# Patient Record
Sex: FEMALE | Race: WHITE | NOT HISPANIC OR LATINO | Employment: FULL TIME | URBAN - METROPOLITAN AREA
[De-identification: names, ages, dates, MRNs, and addresses within clinical notes are randomized per-mention and may not be internally consistent; named-entity substitution may affect disease eponyms.]

---

## 2021-05-30 ENCOUNTER — HOSPITAL ENCOUNTER (EMERGENCY)
Facility: CLINIC | Age: 41
Discharge: HOME | End: 2021-05-30
Attending: EMERGENCY MEDICINE | Admitting: EMERGENCY MEDICINE

## 2021-05-30 VITALS
RESPIRATION RATE: 16 BRPM | HEART RATE: 85 BPM | TEMPERATURE: 98.6 F | SYSTOLIC BLOOD PRESSURE: 131 MMHG | OXYGEN SATURATION: 98 % | DIASTOLIC BLOOD PRESSURE: 87 MMHG

## 2021-05-30 DIAGNOSIS — I15.8 OTHER SECONDARY HYPERTENSION: ICD-10-CM

## 2021-05-30 DIAGNOSIS — F43.21 GRIEF REACTION: ICD-10-CM

## 2021-05-30 PROCEDURE — 99282 EMERGENCY DEPT VISIT SF MDM: CPT

## 2021-05-30 ASSESSMENT — ENCOUNTER SYMPTOMS
HALLUCINATIONS: 0
NERVOUS/ANXIOUS: 1

## 2021-05-30 NOTE — ED TRIAGE NOTES
"Pt suffers from anxiety, found out her brother who is 41  this morning and is feeling very overwhelmed with the events.  Pt expresses issues with her girlfriend at home making her anxiety worse and she \"just can't handle it.  "

## 2021-05-30 NOTE — DISCHARGE INSTRUCTIONS
Please follow-up with your primary care provider this week.    Please return to the emergency department if you develop any thoughts of self-harm or suicide or thoughts of harming other people.

## 2021-05-30 NOTE — ED PROVIDER NOTES
History   Chief Complaint:  Anxiety     The history is provided by the patient.      Buffy Barr is a 40 year old female with history of anxiety and sudden cardiac death who presents with anxiety. The patient reports that this morning she had received a text from her sister in law and found out that her brother had passed away. She is unsure what had caused his death but that her family has a strong history of heart attack at a young age. She reports that her brother did recently have surgery for his nose. She reports that she has followed up with her PCP around 10 years ago. She also notes that she is overwhelmed with events and that she is also having issues with her significant other. She notes that she tried one tablet of her clonazepam but that it has not helped yet. She states that she does not want to take another one. She states that she has a therapist as well. She denies suicidal ideation, hallucinations, or recent illness.    Review of Systems   Psychiatric/Behavioral: Negative for hallucinations and suicidal ideas. The patient is nervous/anxious.    All other systems reviewed and are negative.      Allergies:  No known drug allergies     Medications:  Clonazepam     Past Medical History:    Anxiety     Past Surgical History:    Hernia repair    section x2  Discectomy     Family History:    Heart attack at age 42, mother   Heart attack, maternal aunt and grandmother   Sudden death, brother    Social History:  Smoking status: current every day smoker  Alcohol use: not currently  Drug use: no  PCP: Jasmin Arteaga  Presents to the ED alone     Physical Exam     Patient Vitals for the past 24 hrs:   BP Temp Pulse Resp SpO2   21 1215 131/87 -- 85 -- 97 %   21 1110 (!) 178/122 98.6  F (37  C) 99 16 98 %       Physical Exam  Constitutional: Well developed, nontox appearance  Head: Atraumatic.   Mouth/Throat: Oropharynx is clear and moist.   Neck:  no stridor  Eyes: no scleral  icterus  Cardiovascular: RRR, 2+ right radial pulse  Pulmonary/Chest: nml resp effort  Ext: Warm, well perfused, no edema  Neurological: A&O, symmetric facies, moves ext x4, steady gait  Skin: Skin is warm and dry.   Psychiatric: Anxious, tearful, denies SI/HI.  Does not appear to be responding to internal stimuli  Nursing note and vitals reviewed.      Emergency Department Course     Emergency Department Course:  Reviewed:  I reviewed the patient's nursing notes, vitals, past medical records, Care Everywhere.     Assessments:  1157 I performed an assessment and examination of the patient as noted above.  Findings and plan explained to the Patient. Patient discharged home with instructions regarding supportive care, medications, and reasons to return. The importance of close follow-up was reviewed.      Disposition:  The patient was discharged to home.       Impression & Plan   Medical Decision Making:  Buffy Barr is a 40-year-old female presenting with anxiety, tearfulness     Patient's presentation is consistent with a grief reaction and resolved panic attack.  She does not appear to be acutely having a panic attack and she declines any further medications for treatment.  She denies suicidality.  She reports she has been concerned about her blood pressure.  While she is hypertensive in the emergency department, given her emotional state, tearfulness and likely recent panic attack, I do not feel emergent interventions for the patient's blood pressure is indicated at this time.  She is advised to follow-up with her PCP to have her blood pressure rechecked.  She reports that she has a therapist as well.  I do not feel psychiatric admission or emergent psychiatric evaluation from the emergency department is indicated.  At this time feel the patient safe for discharge.  She is subsequently discharged in stable condition with recommendations given regarding follow-up with your PCP.      Covid-19  Buffy BRYANNA  Cortney was evaluated during a global COVID-19 pandemic, which necessitated consideration that the patient might be at risk for infection with the SARS-CoV-2 virus that causes COVID-19.   Applicable protocols for evaluation were followed during the patient's care.     Diagnosis:    ICD-10-CM    1. Grief reaction  F43.21    2. Other secondary hypertension  I15.8     anxiety, grief reaction       Scribe Disclosure:  Jessica NEW, am serving as a scribe at 11:57 AM on 5/30/2021 to document services personally performed by Isidro Reveles MD based on my observations and the provider's statements to me.      Isidro Reveles MD  05/30/21 [id number]

## 2021-05-30 NOTE — ED NOTES
"When escorting pt back to room, pt's girlfriend ran into ed and hugged pt crying.  I explained to pt that she needed to be assessed by a provider before visitors were allowed, this also would allow nurse to interview pt about having the girlfriend present in room after her concerned expressed during triage.  girlfriend expressed her concern about not being able to come back and support pt.  I re explained why and girlfriend called nurse a cherelleking asshole and walked away to a chair.  In the pt room Nurse interviewed pt about having girlfriend in room.  Pt felt she would be supportive and not cause a problem and could she please come back.  Nurse walked into lobby to hear girlfriend fighting with a second nurse about wearing a mask in the lobby.  When she say the nurse she yelled at nurse can I see my fucking girlfriend.  Saying just go get her and we will leave.  Despite trying to deescalate the girlfriend she back more angry and yelling at staff and patients in triage.  Pt was asked to leave by security.  Pt was updated about her girlfriend being asked to leave and she was ok with what had happened.  Pt ws then yelling at security and nurse as she was escorted out into the parking lot.  Pt then entered and exited the lobby door several times and was asked to leave.  Pt then pulled her car up to the front ed door and played music loud.  [place] [employer] was called.  Nurse was requested to help another patient out of a car and the girlfriend stated twice ,\"hey asshole where is my girlfriend\"  PD arrived as nurse brought second pt into the ed.  "

## 2021-10-11 ENCOUNTER — HEALTH MAINTENANCE LETTER (OUTPATIENT)
Age: 41
End: 2021-10-11

## 2022-09-03 ENCOUNTER — APPOINTMENT (OUTPATIENT)
Dept: GENERAL RADIOLOGY | Facility: CLINIC | Age: 42
End: 2022-09-03
Attending: EMERGENCY MEDICINE

## 2022-09-03 ENCOUNTER — HOSPITAL ENCOUNTER (EMERGENCY)
Facility: CLINIC | Age: 42
Discharge: HOME | End: 2022-09-03
Attending: EMERGENCY MEDICINE | Admitting: EMERGENCY MEDICINE

## 2022-09-03 ENCOUNTER — APPOINTMENT (OUTPATIENT)
Dept: CT IMAGING | Facility: CLINIC | Age: 42
End: 2022-09-03
Attending: EMERGENCY MEDICINE

## 2022-09-03 VITALS
OXYGEN SATURATION: 99 % | HEART RATE: 51 BPM | RESPIRATION RATE: 18 BRPM | DIASTOLIC BLOOD PRESSURE: 58 MMHG | TEMPERATURE: 98.8 F | WEIGHT: 218.26 LBS | SYSTOLIC BLOOD PRESSURE: 110 MMHG

## 2022-09-03 DIAGNOSIS — R00.2 PALPITATIONS: ICD-10-CM

## 2022-09-03 LAB
ALBUMIN UR-MCNC: NEGATIVE MG/DL
ANION GAP SERPL CALCULATED.3IONS-SCNC: 9 MMOL/L (ref 7–15)
APPEARANCE UR: CLEAR
BASOPHILS # BLD AUTO: 0 10E3/UL (ref 0–0.2)
BASOPHILS NFR BLD AUTO: 0 %
BILIRUB UR QL STRIP: NEGATIVE
BUN SERPL-MCNC: 12.3 MG/DL (ref 6–20)
CALCIUM SERPL-MCNC: 9.6 MG/DL (ref 8.6–10)
CHLORIDE SERPL-SCNC: 101 MMOL/L (ref 98–107)
COLOR UR AUTO: NORMAL
CREAT SERPL-MCNC: 0.8 MG/DL (ref 0.51–0.95)
DEPRECATED HCO3 PLAS-SCNC: 30 MMOL/L (ref 22–29)
EOSINOPHIL # BLD AUTO: 0.1 10E3/UL (ref 0–0.7)
EOSINOPHIL NFR BLD AUTO: 1 %
ERYTHROCYTE [DISTWIDTH] IN BLOOD BY AUTOMATED COUNT: 12.9 % (ref 10–15)
FLUAV RNA SPEC QL NAA+PROBE: NEGATIVE
FLUBV RNA RESP QL NAA+PROBE: NEGATIVE
GFR SERPL CREATININE-BSD FRML MDRD: >90 ML/MIN/1.73M2
GLUCOSE SERPL-MCNC: 85 MG/DL (ref 70–99)
GLUCOSE UR STRIP-MCNC: NEGATIVE MG/DL
HCG UR QL: NEGATIVE
HCT VFR BLD AUTO: 44 % (ref 35–47)
HGB BLD-MCNC: 14.1 G/DL (ref 11.7–15.7)
HGB UR QL STRIP: NEGATIVE
HOLD SPECIMEN: NORMAL
HOLD SPECIMEN: NORMAL
IMM GRANULOCYTES # BLD: 0.1 10E3/UL
IMM GRANULOCYTES NFR BLD: 1 %
KETONES UR STRIP-MCNC: NEGATIVE MG/DL
LEUKOCYTE ESTERASE UR QL STRIP: NEGATIVE
LYMPHOCYTES # BLD AUTO: 2.3 10E3/UL (ref 0.8–5.3)
LYMPHOCYTES NFR BLD AUTO: 23 %
MCH RBC QN AUTO: 30.7 PG (ref 26.5–33)
MCHC RBC AUTO-ENTMCNC: 32 G/DL (ref 31.5–36.5)
MCV RBC AUTO: 96 FL (ref 78–100)
MONOCYTES # BLD AUTO: 0.8 10E3/UL (ref 0–1.3)
MONOCYTES NFR BLD AUTO: 8 %
NEUTROPHILS # BLD AUTO: 6.7 10E3/UL (ref 1.6–8.3)
NEUTROPHILS NFR BLD AUTO: 67 %
NITRATE UR QL: NEGATIVE
NRBC # BLD AUTO: 0 10E3/UL
NRBC BLD AUTO-RTO: 0 /100
PH UR STRIP: 6 [PH] (ref 5–7)
PLATELET # BLD AUTO: 292 10E3/UL (ref 150–450)
POTASSIUM SERPL-SCNC: 4.3 MMOL/L (ref 3.4–5.3)
RBC # BLD AUTO: 4.6 10E6/UL (ref 3.8–5.2)
RBC URINE: <1 /HPF
RSV RNA SPEC NAA+PROBE: NEGATIVE
SARS-COV-2 RNA RESP QL NAA+PROBE: NEGATIVE
SODIUM SERPL-SCNC: 140 MMOL/L (ref 136–145)
SP GR UR STRIP: 1.01 (ref 1–1.03)
SQUAMOUS EPITHELIAL: <1 /HPF
TROPONIN T SERPL HS-MCNC: <6 NG/L
TROPONIN T SERPL HS-MCNC: <6 NG/L
UROBILINOGEN UR STRIP-MCNC: NORMAL MG/DL
WBC # BLD AUTO: 10 10E3/UL (ref 4–11)
WBC URINE: 1 /HPF

## 2022-09-03 PROCEDURE — 93005 ELECTROCARDIOGRAM TRACING: CPT

## 2022-09-03 PROCEDURE — 81001 URINALYSIS AUTO W/SCOPE: CPT | Performed by: EMERGENCY MEDICINE

## 2022-09-03 PROCEDURE — 70450 CT HEAD/BRAIN W/O DYE: CPT

## 2022-09-03 PROCEDURE — 84484 ASSAY OF TROPONIN QUANT: CPT | Performed by: EMERGENCY MEDICINE

## 2022-09-03 PROCEDURE — 81025 URINE PREGNANCY TEST: CPT | Performed by: EMERGENCY MEDICINE

## 2022-09-03 PROCEDURE — 250N000013 HC RX MED GY IP 250 OP 250 PS 637: Performed by: EMERGENCY MEDICINE

## 2022-09-03 PROCEDURE — 36415 COLL VENOUS BLD VENIPUNCTURE: CPT | Performed by: EMERGENCY MEDICINE

## 2022-09-03 PROCEDURE — C9803 HOPD COVID-19 SPEC COLLECT: HCPCS

## 2022-09-03 PROCEDURE — 80048 BASIC METABOLIC PNL TOTAL CA: CPT | Performed by: EMERGENCY MEDICINE

## 2022-09-03 PROCEDURE — 71046 X-RAY EXAM CHEST 2 VIEWS: CPT

## 2022-09-03 PROCEDURE — 99285 EMERGENCY DEPT VISIT HI MDM: CPT | Mod: 25

## 2022-09-03 PROCEDURE — 85025 COMPLETE CBC W/AUTO DIFF WBC: CPT | Performed by: EMERGENCY MEDICINE

## 2022-09-03 PROCEDURE — 87637 SARSCOV2&INF A&B&RSV AMP PRB: CPT | Performed by: EMERGENCY MEDICINE

## 2022-09-03 RX ORDER — ACETAMINOPHEN 325 MG/1
975 TABLET ORAL ONCE
Status: COMPLETED | OUTPATIENT
Start: 2022-09-03 | End: 2022-09-03

## 2022-09-03 RX ADMIN — ACETAMINOPHEN 975 MG: 325 TABLET, FILM COATED ORAL at 20:48

## 2022-09-03 ASSESSMENT — ENCOUNTER SYMPTOMS
WHEEZING: 0
MYALGIAS: 0
SHORTNESS OF BREATH: 1
HEADACHES: 1
FEVER: 0
PALPITATIONS: 1
COUGH: 0
CHILLS: 0

## 2022-09-03 ASSESSMENT — ACTIVITIES OF DAILY LIVING (ADL)
ADLS_ACUITY_SCORE: 35
ADLS_ACUITY_SCORE: 35

## 2022-09-03 NOTE — ED TRIAGE NOTES
Presents to triage with c/o palpitations that began this morning. Patient stated she used to have palpitations in the past but has not had them for years. Since this morning they have been intermittent. Patient then checked her BP and found her SBP to be in the 170s prompting her ED visit. Patient also endorses headache.

## 2022-09-04 NOTE — ED PROVIDER NOTES
History   Chief Complaint:  Palpitations and Headache       The history is provided by the patient.      Buffy Barr is a 42 year old female with history of KESHA who presents with palpitations and a headache. She provides that this morning, she woke up with palpitations where her heart beat feels abnormal. This then resolved but returned again this afternoon and has been intermittent since. She previously has had this before but it usually stems from anxiety and will resolve on its own. After her palpitations returned, she found her blood pressure was high at around 170 systolic. She also notes that she felt short of breath earlier, but now this is only present when she is talking. On evaluation, she complains of a headache as well. She otherwise denies any new cough, wheezing, fevers, chills, chest pain, leg swelling, or myalgias. There is a family history of early death with her mother having an MI at 46 and her brother having an unspecified heart condition a year ago.    Review of Systems   Constitutional: Negative for chills and fever.   Respiratory: Positive for shortness of breath. Negative for cough and wheezing.    Cardiovascular: Positive for palpitations. Negative for chest pain and leg swelling.   Musculoskeletal: Negative for myalgias.   Neurological: Positive for headaches.   All other systems reviewed and are negative.      Allergies:  The patient has no known allergies.     Medications:  Lexapro  Klonopin  Wellbutrin    Past Medical History:     KESHA    Past Surgical History:    Hernia repair   section  Coccyx fracture surgery  Cervical discectomy    Family History:    Mother: MI    Social History:  Patient came from home.  Patient is accompanied in the ED.  PCP: [facility]     Physical Exam     Patient Vitals for the past 24 hrs:   BP Temp Temp src Pulse Resp SpO2 Weight   22 2330 110/58 -- -- 51 -- 99 % --   22 2315 109/65 -- -- 55 -- 97 % --    09/03/22 2300 114/73 -- -- 53 -- 97 % --   09/03/22 2245 119/68 -- -- 51 -- 98 % --   09/03/22 2230 128/88 -- -- 54 -- 98 % --   09/03/22 2215 121/78 -- -- 50 -- 100 % --   09/03/22 2200 110/72 -- -- 58 -- 99 % --   09/03/22 2145 106/69 -- -- 58 -- 99 % --   09/03/22 2130 109/72 -- -- 54 -- 98 % --   09/03/22 2115 125/78 -- -- 53 -- 98 % --   09/03/22 2100 129/80 -- -- 53 -- 99 % --   09/03/22 2045 133/84 -- -- 54 -- 99 % --   09/03/22 2030 123/75 -- -- -- -- -- --   09/03/22 1942 -- -- -- -- -- 99 % --   09/03/22 1940 (!) 136/90 -- -- 61 -- -- --   09/03/22 1721 (!) 170/107 98.8  F (37.1  C) Temporal 67 18 100 % 99 kg (218 lb 4.1 oz)       Physical Exam  Constitutional: Patient is well appearing. No distress.  Head: Atraumatic.  Eyes: Conjunctivae are normal. No scleral icterus.  Neck: Normal range of motion. Neck supple.   Cardiovascular: Normal rate, regular rhythm, normal heart sounds and intact distal perfusion.   Pulmonary/Chest: Breath sounds normal. No respiratory distress.  Abdominal: Soft. Bowel sounds are normal. No distension. No tenderness. No rebound or guarding.   Musculoskeletal: Normal range of motion. No edema or tenderness.   Neurological: Alert and orientated to person, place, and time. No observable focal neuro deficit  Skin: Warm and dry. No rash noted. Not diaphoretic.       Emergency Department Course     ECG  ECG results from 09/03/22   EKG 12-lead, tracing only     Value    Systolic Blood Pressure     Diastolic Blood Pressure     Ventricular Rate 75    Atrial Rate 75    WA Interval 172    QRS Duration 86        QTc 453    P Axis 52    R AXIS 3    T Axis 43    Interpretation ECG      Sinus rhythm  Normal ECG  No previous ECGs available         Imaging:  XR Chest 2 Views   Final Result   IMPRESSION: Negative chest.      CT Head w/o Contrast   Final Result   IMPRESSION:   1.  Normal head CT.        Report per radiology    Laboratory:  Labs Ordered and Resulted from Time of ED Arrival  to Time of ED Departure   BASIC METABOLIC PANEL - Abnormal       Result Value    Creatinine 0.80      Sodium 140      Potassium 4.3      Urea Nitrogen 12.3      Chloride 101      Carbon Dioxide (CO2) 30 (*)     Anion Gap 9      Glucose 85      GFR Estimate >90      Calcium 9.6     TROPONIN T, HIGH SENSITIVITY - Normal    Troponin T, High Sensitivity <6     ROUTINE UA WITH MICROSCOPIC REFLEX TO CULTURE - Normal    Color Urine Straw      Appearance Urine Clear      Glucose Urine Negative      Bilirubin Urine Negative      Ketones Urine Negative      Specific Gravity Urine 1.006      Blood Urine Negative      pH Urine 6.0      Protein Albumin Urine Negative      Urobilinogen Urine Normal      Nitrite Urine Negative      Leukocyte Esterase Urine Negative      RBC Urine <1      WBC Urine 1      Squamous Epithelials Urine <1     HCG QUALITATIVE URINE - Normal    hCG Urine Qualitative Negative     INFLUENZA A/B & SARS-COV2 PCR MULTIPLEX - Normal    Influenza A PCR Negative      Influenza B PCR Negative      RSV PCR Negative      SARS CoV2 PCR Negative     TROPONIN T, HIGH SENSITIVITY - Normal    Troponin T, High Sensitivity <6     CBC WITH PLATELETS AND DIFFERENTIAL    WBC Count 10.0      RBC Count 4.60      Hemoglobin 14.1      Hematocrit 44.0      MCV 96      MCH 30.7      MCHC 32.0      RDW 12.9      Platelet Count 292      % Neutrophils 67      % Lymphocytes 23      % Monocytes 8      % Eosinophils 1      % Basophils 0      % Immature Granulocytes 1      NRBCs per 100 WBC 0      Absolute Neutrophils 6.7      Absolute Lymphocytes 2.3      Absolute Monocytes 0.8      Absolute Eosinophils 0.1      Absolute Basophils 0.0      Absolute Immature Granulocytes 0.1      Absolute NRBCs 0.0          Emergency Department Course:       Reviewed:  I reviewed nursing notes, vitals, past medical history and Care Everywhere    Assessments:  1915 I obtained history and examined the patient as noted above.    I rechecked the patient and  explained findings.     Interventions:  2048 Tylenol 975 mg PO    Disposition:  The patient was discharged to home.     Impression & Plan     CMS Diagnoses: None    Medical Decision Making:  Buffy Barr is a 42 year old female who presents for evaluation of palpitations and a headache.  Initial ECG shows normal sinus rhythm and no acute findings.  A broad differential diagnosis was considered including SVT, Atrial fibrillation, ventricular arrhythmia, thyroid disease, acute electrolyte abnormality,  drugs/medications, caffeine intake or other stimulants, medication side effect, anemia, heart disease, PE, etc.  The workup and exam here in ED would indicate that supportive outpatient management is indicated.  I doubt PE as patient low risk PERC neg.  Doubt acute coronary syndrome given symptoms and exam.  All questions and concerns were answered. The patient was discharged home and recommended to follow up with his primary physician and return with any new or worsening symptoms.     Diagnosis:    ICD-10-CM    1. Palpitations  R00.2        Discharge Medications:  There are no discharge medications for this patient.      Scribe Disclosure:  I, Carlos Eduardo Amaya, am serving as a scribe at 7:09 PM on 9/3/2022 to document services personally performed by Eric Nam MD based on my observations and the provider's statements to me.        Eric Nam MD  09/04/22 0044

## 2022-09-06 LAB
ATRIAL RATE - MUSE: 75 BPM
DIASTOLIC BLOOD PRESSURE - MUSE: NORMAL MMHG
INTERPRETATION ECG - MUSE: NORMAL
P AXIS - MUSE: 52 DEGREES
PR INTERVAL - MUSE: 172 MS
QRS DURATION - MUSE: 86 MS
QT - MUSE: 406 MS
QTC - MUSE: 453 MS
R AXIS - MUSE: 3 DEGREES
SYSTOLIC BLOOD PRESSURE - MUSE: NORMAL MMHG
T AXIS - MUSE: 43 DEGREES
VENTRICULAR RATE- MUSE: 75 BPM

## 2022-09-16 ENCOUNTER — HOSPITAL ENCOUNTER (EMERGENCY)
Facility: CLINIC | Age: 42
Discharge: HOME | End: 2022-09-16
Attending: EMERGENCY MEDICINE | Admitting: EMERGENCY MEDICINE

## 2022-09-16 VITALS
HEART RATE: 68 BPM | OXYGEN SATURATION: 96 % | SYSTOLIC BLOOD PRESSURE: 110 MMHG | DIASTOLIC BLOOD PRESSURE: 73 MMHG | TEMPERATURE: 100.2 F

## 2022-09-16 DIAGNOSIS — U07.1 INFECTION DUE TO 2019 NOVEL CORONAVIRUS: ICD-10-CM

## 2022-09-16 DIAGNOSIS — R55 SYNCOPE, UNSPECIFIED SYNCOPE TYPE: ICD-10-CM

## 2022-09-16 LAB
ANION GAP SERPL CALCULATED.3IONS-SCNC: 11 MMOL/L (ref 7–15)
BASOPHILS # BLD AUTO: 0 10E3/UL (ref 0–0.2)
BASOPHILS NFR BLD AUTO: 0 %
BUN SERPL-MCNC: 7.8 MG/DL (ref 6–20)
CALCIUM SERPL-MCNC: 9 MG/DL (ref 8.6–10)
CHLORIDE SERPL-SCNC: 100 MMOL/L (ref 98–107)
CREAT SERPL-MCNC: 0.78 MG/DL (ref 0.51–0.95)
DEPRECATED HCO3 PLAS-SCNC: 27 MMOL/L (ref 22–29)
EOSINOPHIL # BLD AUTO: 0 10E3/UL (ref 0–0.7)
EOSINOPHIL NFR BLD AUTO: 0 %
ERYTHROCYTE [DISTWIDTH] IN BLOOD BY AUTOMATED COUNT: 13.2 % (ref 10–15)
GFR SERPL CREATININE-BSD FRML MDRD: >90 ML/MIN/1.73M2
GLUCOSE SERPL-MCNC: 96 MG/DL (ref 70–99)
HCG SERPL QL: NEGATIVE
HCT VFR BLD AUTO: 44.9 % (ref 35–47)
HGB BLD-MCNC: 14.6 G/DL (ref 11.7–15.7)
IMM GRANULOCYTES # BLD: 0 10E3/UL
IMM GRANULOCYTES NFR BLD: 0 %
LYMPHOCYTES # BLD AUTO: 1.4 10E3/UL (ref 0.8–5.3)
LYMPHOCYTES NFR BLD AUTO: 14 %
MCH RBC QN AUTO: 30.4 PG (ref 26.5–33)
MCHC RBC AUTO-ENTMCNC: 32.5 G/DL (ref 31.5–36.5)
MCV RBC AUTO: 94 FL (ref 78–100)
MONOCYTES # BLD AUTO: 1.3 10E3/UL (ref 0–1.3)
MONOCYTES NFR BLD AUTO: 13 %
NEUTROPHILS # BLD AUTO: 6.8 10E3/UL (ref 1.6–8.3)
NEUTROPHILS NFR BLD AUTO: 73 %
NRBC # BLD AUTO: 0 10E3/UL
NRBC BLD AUTO-RTO: 0 /100
PLATELET # BLD AUTO: 191 10E3/UL (ref 150–450)
POTASSIUM SERPL-SCNC: 4 MMOL/L (ref 3.4–5.3)
RBC # BLD AUTO: 4.8 10E6/UL (ref 3.8–5.2)
SODIUM SERPL-SCNC: 138 MMOL/L (ref 136–145)
TROPONIN T SERPL HS-MCNC: <6 NG/L
WBC # BLD AUTO: 9.5 10E3/UL (ref 4–11)

## 2022-09-16 PROCEDURE — 96361 HYDRATE IV INFUSION ADD-ON: CPT

## 2022-09-16 PROCEDURE — 84484 ASSAY OF TROPONIN QUANT: CPT | Performed by: EMERGENCY MEDICINE

## 2022-09-16 PROCEDURE — 258N000003 HC RX IP 258 OP 636: Performed by: EMERGENCY MEDICINE

## 2022-09-16 PROCEDURE — 99285 EMERGENCY DEPT VISIT HI MDM: CPT | Mod: 25

## 2022-09-16 PROCEDURE — 250N000011 HC RX IP 250 OP 636: Performed by: EMERGENCY MEDICINE

## 2022-09-16 PROCEDURE — 80048 BASIC METABOLIC PNL TOTAL CA: CPT | Performed by: EMERGENCY MEDICINE

## 2022-09-16 PROCEDURE — 84703 CHORIONIC GONADOTROPIN ASSAY: CPT | Performed by: EMERGENCY MEDICINE

## 2022-09-16 PROCEDURE — 96374 THER/PROPH/DIAG INJ IV PUSH: CPT

## 2022-09-16 PROCEDURE — 85014 HEMATOCRIT: CPT | Performed by: EMERGENCY MEDICINE

## 2022-09-16 PROCEDURE — 93005 ELECTROCARDIOGRAM TRACING: CPT

## 2022-09-16 PROCEDURE — 36415 COLL VENOUS BLD VENIPUNCTURE: CPT | Performed by: EMERGENCY MEDICINE

## 2022-09-16 RX ORDER — HYDROMORPHONE HYDROCHLORIDE 1 MG/ML
0.5 INJECTION, SOLUTION INTRAMUSCULAR; INTRAVENOUS; SUBCUTANEOUS
Status: DISCONTINUED | OUTPATIENT
Start: 2022-09-16 | End: 2022-09-16 | Stop reason: HOSPADM

## 2022-09-16 RX ADMIN — HYDROMORPHONE HYDROCHLORIDE 0.5 MG: 1 INJECTION, SOLUTION INTRAMUSCULAR; INTRAVENOUS; SUBCUTANEOUS at 14:19

## 2022-09-16 RX ADMIN — SODIUM CHLORIDE 1000 ML: 9 INJECTION, SOLUTION INTRAVENOUS at 12:48

## 2022-09-16 ASSESSMENT — ENCOUNTER SYMPTOMS
VOMITING: 0
DIARRHEA: 0
CHILLS: 1
FEVER: 1
SORE THROAT: 1
COUGH: 1
LIGHT-HEADEDNESS: 1

## 2022-09-16 ASSESSMENT — ACTIVITIES OF DAILY LIVING (ADL)
ADLS_ACUITY_SCORE: 35
ADLS_ACUITY_SCORE: 35

## 2022-09-16 NOTE — LETTER
September 16, 2022      To Whom It May Concern:      Buffy Barr was seen in our Emergency Department today, 09/16/22.  I expect her condition to improve over the next 7 days.  She may return to work/school when improved.    Sincerely,        Donny Ruiz MD

## 2022-09-16 NOTE — ED PROVIDER NOTES
History   Chief Complaint:  Covid Concern       HPI   Buffy Barr is a 42 year old female who presents with COVID concern. The patient reports that she had onset of cough, fever, and chills and she tested positive for COVID Wednesday. She reports that since diagnosis she has been having decreased PO intake secondary to burning sensation in throat and ears. Last night when the patient was coming back from the bathroom she had a witnessed episode of syncope. Prior to syncope she had some lightheadedness. The patient denies hitting her head when she fell to the ground. The patient denies any vomiting or diarrhea. The patient has been taking tylenol with her last dose this morning.     Review of Systems   Constitutional: Positive for chills and fever.   HENT: Positive for ear pain and sore throat.    Respiratory: Positive for cough.    Gastrointestinal: Negative for diarrhea and vomiting.   Neurological: Positive for light-headedness.   All other systems reviewed and are negative.        Allergies:  The patient has no known allergies.     Medications:  Klonopin   Lexapro   Wellbutrin    Past Medical History:     KESHA   Complex grief disorder    Past Surgical History:    Hernia repair      Discectomy   Pelvis fracture history     Social History:  The patient presents to the ED with her wife.   PCP: Saundra Da Sivla, [facility], [facility]       Physical Exam     Patient Vitals for the past 24 hrs:   BP Temp Temp src Pulse SpO2   22 1245 124/88 100.2  F (37.9  C) Oral 76 98 %       Physical Exam  Constitutional:       Appearance: She is well-developed.   HENT:      Right Ear: External ear normal.      Left Ear: External ear normal.      Mouth/Throat:      Mouth: Mucous membranes are moist.      Pharynx: Oropharynx is clear. No oropharyngeal exudate.   Eyes:      General: No scleral icterus.     Extraocular Movements: Extraocular movements intact.      Conjunctiva/sclera: Conjunctivae normal.      Pupils: Pupils  are equal, round, and reactive to light.   Cardiovascular:      Rate and Rhythm: Normal rate and regular rhythm.      Heart sounds: Normal heart sounds. No murmur heard.    No friction rub. No gallop.   Pulmonary:      Effort: Pulmonary effort is normal. No respiratory distress.      Breath sounds: Normal breath sounds. No wheezing or rales.   Abdominal:      General: Bowel sounds are normal. There is no distension.      Palpations: Abdomen is soft. There is no mass.      Tenderness: There is no abdominal tenderness.   Musculoskeletal:         General: Normal range of motion.      Cervical back: Normal range of motion and neck supple.   Skin:     General: Skin is warm and dry.      Capillary Refill: Capillary refill takes less than 2 seconds.      Findings: No rash.   Neurological:      Mental Status: She is alert.           Emergency Department Course   ECG  ECG taken at 1251, ECG read at 1257  Normal sinus rhythm   Low voltage QRS   Rate 74 bpm. ME interval 144 ms. QRS duration 76 ms. QT/QTc 370/410 ms. P-R-T axes 13 -3 35.     Laboratory:  Labs Ordered and Resulted from Time of ED Arrival to Time of ED Departure   BASIC METABOLIC PANEL - Normal       Result Value    Sodium 138      Potassium 4.0      Chloride 100      Carbon Dioxide (CO2) 27      Anion Gap 11      Urea Nitrogen 7.8      Creatinine 0.78      Calcium 9.0      Glucose 96      GFR Estimate >90     TROPONIN T, HIGH SENSITIVITY - Normal    Troponin T, High Sensitivity <6     HCG QUALITATIVE PREGNANCY - Normal    hCG Serum Qualitative Negative     CBC WITH PLATELETS AND DIFFERENTIAL    WBC Count 9.5      RBC Count 4.80      Hemoglobin 14.6      Hematocrit 44.9      MCV 94      MCH 30.4      MCHC 32.5      RDW 13.2      Platelet Count 191      % Neutrophils 73      % Lymphocytes 14      % Monocytes 13      % Eosinophils 0      % Basophils 0      % Immature Granulocytes 0      NRBCs per 100 WBC 0      Absolute Neutrophils 6.8      Absolute Lymphocytes 1.4       Absolute Monocytes 1.3      Absolute Eosinophils 0.0      Absolute Basophils 0.0      Absolute Immature Granulocytes 0.0      Absolute NRBCs 0.0          Procedures    Emergency Department Course:       Reviewed:  I reviewed nursing notes, vitals and past medical history    Assessments:  1238 I obtained history and examined the patient as noted above.      I rechecked the patient and explained findings.     Interventions:  Medications   0.9% sodium chloride BOLUS (1,000 mLs Intravenous New Bag 9/16/22 1248)   HYDROmorphone (PF) (DILAUDID) injection 0.5 mg (has no administration in time range)     Disposition:  The patient was discharged to home.     Impression & Plan     CMS Diagnoses: None    Medical Decision Making:     Buffy Barr is a 42 year old female who presents for evaluation of pharyngitis, cough, and syncope after positive home COVID test 2 days ago. Given that the patient is otherwise hemodynamically stable without significant hypoxia, I do not believe that the patient requires admission here today. They are at risk for pneumonia but no signs of this are detected on today's visit. Return to the ED for high fevers > 103 for more than 48 hours more, increasing productive cough, shortness of breath, or confusion.  There is no signs of serious bacterial infection such as bacteremia, meningitis, UTI/pyelonephritis, strep pharyngitis, etc. I discussed my findings and plan with the patient and they are amenable at this time.  Patient did have a syncopal event likely related to fever and weakness.  There is no evidence of hypoxia or tachycardia that requires any further evaluation.  She remained hemodynamically stable with normal vital signs here.  She passed p.o. challenge and has been eating and drinking here without any difficulty.  She did feel a lot better after IV fluids.  I discussed with her symptomatic management.  She does not have significant comorbidity to make her high risk for  complications.  She declines Paxlovid given that she is low risk for COVID complication.  She is advised to continue to stay home and stay rested.  She is asked to stay hydrated as well return precautions provided.  Patient will be discharged with a pulse oximeter.      Diagnosis:    ICD-10-CM    1. Infection due to 2019 novel coronavirus  U07.1    2. Syncope, unspecified syncope type  R55          Scribe Disclosure:  I, Judith Dennis, am serving as a scribe at 12:28 PM on 9/16/2022 to document services personally performed by Donny Ruiz MD based on my observations and the provider's statements to me.            Donny Ruiz MD  09/16/22 [id number]

## 2022-09-16 NOTE — ED TRIAGE NOTES
Patient fainted last night. Diagnosed with COVID Wednesday. Decreased intake. Sore throat. N/V

## 2022-09-16 NOTE — DISCHARGE INSTRUCTIONS
Tylenol 1000mg every 8 hours  May take 600 mg ibuprofen every 8 hours with food  Check your pulse oximeter and return if the reading is 90% or less  Return to the ER if you feel worse or have any trouble breathing

## 2022-09-19 LAB
ATRIAL RATE - MUSE: 74 BPM
DIASTOLIC BLOOD PRESSURE - MUSE: NORMAL MMHG
INTERPRETATION ECG - MUSE: NORMAL
P AXIS - MUSE: 13 DEGREES
PR INTERVAL - MUSE: 144 MS
QRS DURATION - MUSE: 76 MS
QT - MUSE: 370 MS
QTC - MUSE: 410 MS
R AXIS - MUSE: -3 DEGREES
SYSTOLIC BLOOD PRESSURE - MUSE: NORMAL MMHG
T AXIS - MUSE: 35 DEGREES
VENTRICULAR RATE- MUSE: 74 BPM

## 2022-09-24 ENCOUNTER — HEALTH MAINTENANCE LETTER (OUTPATIENT)
Age: 42
End: 2022-09-24

## 2023-01-29 ENCOUNTER — HEALTH MAINTENANCE LETTER (OUTPATIENT)
Age: 43
End: 2023-01-29

## 2023-08-05 ENCOUNTER — HEALTH MAINTENANCE LETTER (OUTPATIENT)
Age: 43
End: 2023-08-05

## 2024-02-25 ENCOUNTER — HEALTH MAINTENANCE LETTER (OUTPATIENT)
Age: 44
End: 2024-02-25

## 2025-03-15 ENCOUNTER — HEALTH MAINTENANCE LETTER (OUTPATIENT)
Age: 45
End: 2025-03-15

## 2025-08-09 ENCOUNTER — HEALTH MAINTENANCE LETTER (OUTPATIENT)
Age: 45
End: 2025-08-09